# Patient Record
Sex: MALE | Race: WHITE | Employment: OTHER | URBAN - METROPOLITAN AREA
[De-identification: names, ages, dates, MRNs, and addresses within clinical notes are randomized per-mention and may not be internally consistent; named-entity substitution may affect disease eponyms.]

---

## 2018-07-21 PROBLEM — N40.0 ENLARGED PROSTATE WITHOUT LOWER URINARY TRACT SYMPTOMS (LUTS): Status: ACTIVE | Noted: 2018-07-21

## 2018-07-21 PROBLEM — E29.1 MALE HYPOGONADISM: Status: ACTIVE | Noted: 2018-07-21

## 2018-07-21 PROBLEM — N52.9 MALE ERECTILE DYSFUNCTION, UNSPECIFIED: Status: ACTIVE | Noted: 2018-07-21

## 2018-07-23 PROBLEM — Z80.42 FAMILY HISTORY OF PROSTATE CANCER: Status: ACTIVE | Noted: 2018-07-23

## 2018-07-23 PROBLEM — N39.41 URGE INCONTINENCE: Status: ACTIVE | Noted: 2018-07-23

## 2018-07-23 PROBLEM — R35.0 URINARY FREQUENCY: Status: ACTIVE | Noted: 2018-07-23

## 2018-07-23 PROBLEM — F52.32 DELAYED EJACULATION: Status: ACTIVE | Noted: 2018-07-23

## 2018-10-05 ENCOUNTER — HOSPITAL ENCOUNTER (OUTPATIENT)
Dept: PREADMISSION TESTING | Age: 78
Discharge: HOME | End: 2018-10-05
Payer: MEDICARE

## 2018-10-10 ENCOUNTER — HOSPITAL ENCOUNTER (OUTPATIENT)
Age: 78
Setting detail: OUTPATIENT SURGERY
Discharge: HOME | End: 2018-10-10
Attending: PLASTIC SURGERY | Admitting: PLASTIC SURGERY
Payer: MEDICARE

## 2018-10-10 ENCOUNTER — ANESTHESIA (OUTPATIENT)
Dept: SURGERY | Age: 78
End: 2018-10-10
Payer: MEDICARE

## 2018-10-10 ENCOUNTER — ANESTHESIA EVENT (OUTPATIENT)
Dept: SURGERY | Age: 78
End: 2018-10-10
Payer: MEDICARE

## 2018-10-10 VITALS
OXYGEN SATURATION: 98 % | HEIGHT: 72 IN | SYSTOLIC BLOOD PRESSURE: 121 MMHG | TEMPERATURE: 97.9 F | BODY MASS INDEX: 24.14 KG/M2 | WEIGHT: 178.19 LBS | DIASTOLIC BLOOD PRESSURE: 69 MMHG | HEART RATE: 71 BPM | RESPIRATION RATE: 16 BRPM

## 2018-10-10 PROCEDURE — 77030002916 HC SUT ETHLN J&J -A: Performed by: PLASTIC SURGERY

## 2018-10-10 PROCEDURE — 77030002986 HC SUT PROL J&J -A: Performed by: PLASTIC SURGERY

## 2018-10-10 PROCEDURE — 77030013567 HC DRN WND RESERV BARD -A: Performed by: PLASTIC SURGERY

## 2018-10-10 PROCEDURE — 74011250636 HC RX REV CODE- 250/636: Performed by: PLASTIC SURGERY

## 2018-10-10 PROCEDURE — 88331 PATH CONSLTJ SURG 1 BLK 1SPC: CPT | Performed by: PLASTIC SURGERY

## 2018-10-10 PROCEDURE — 88332 PATH CONSLTJ SURG EA ADD BLK: CPT | Performed by: PLASTIC SURGERY

## 2018-10-10 PROCEDURE — 74011250636 HC RX REV CODE- 250/636

## 2018-10-10 PROCEDURE — 77030002933 HC SUT MCRYL J&J -A: Performed by: PLASTIC SURGERY

## 2018-10-10 PROCEDURE — 76060000034 HC ANESTHESIA 1.5 TO 2 HR: Performed by: PLASTIC SURGERY

## 2018-10-10 PROCEDURE — 77030002966 HC SUT PDS J&J -A: Performed by: PLASTIC SURGERY

## 2018-10-10 PROCEDURE — 88305 TISSUE EXAM BY PATHOLOGIST: CPT | Performed by: PLASTIC SURGERY

## 2018-10-10 PROCEDURE — 77030011283 HC ELECTRD NDL COVD -A: Performed by: PLASTIC SURGERY

## 2018-10-10 PROCEDURE — 77030020782 HC GWN BAIR PAWS FLX 3M -B: Performed by: PLASTIC SURGERY

## 2018-10-10 PROCEDURE — 77030011265 HC ELECTRD BLD HEX COVD -A: Performed by: PLASTIC SURGERY

## 2018-10-10 PROCEDURE — 76210000020 HC REC RM PH II FIRST 0.5 HR: Performed by: PLASTIC SURGERY

## 2018-10-10 PROCEDURE — 76010000153 HC OR TIME 1.5 TO 2 HR: Performed by: PLASTIC SURGERY

## 2018-10-10 PROCEDURE — 77030018836 HC SOL IRR NACL ICUM -A: Performed by: PLASTIC SURGERY

## 2018-10-10 PROCEDURE — 74011000250 HC RX REV CODE- 250: Performed by: PLASTIC SURGERY

## 2018-10-10 PROCEDURE — 77030032490 HC SLV COMPR SCD KNE COVD -B: Performed by: PLASTIC SURGERY

## 2018-10-10 PROCEDURE — 77030012406 HC DRN WND PENRS BARD -A: Performed by: PLASTIC SURGERY

## 2018-10-10 PROCEDURE — 76210000063 HC OR PH I REC FIRST 0.5 HR: Performed by: PLASTIC SURGERY

## 2018-10-10 RX ORDER — KETOROLAC TROMETHAMINE 30 MG/ML
INJECTION, SOLUTION INTRAMUSCULAR; INTRAVENOUS AS NEEDED
Status: DISCONTINUED | OUTPATIENT
Start: 2018-10-10 | End: 2018-10-10 | Stop reason: HOSPADM

## 2018-10-10 RX ORDER — SODIUM CHLORIDE, SODIUM LACTATE, POTASSIUM CHLORIDE, CALCIUM CHLORIDE 600; 310; 30; 20 MG/100ML; MG/100ML; MG/100ML; MG/100ML
150 INJECTION, SOLUTION INTRAVENOUS CONTINUOUS
Status: DISCONTINUED | OUTPATIENT
Start: 2018-10-10 | End: 2018-10-10 | Stop reason: HOSPADM

## 2018-10-10 RX ORDER — SODIUM CHLORIDE, SODIUM LACTATE, POTASSIUM CHLORIDE, CALCIUM CHLORIDE 600; 310; 30; 20 MG/100ML; MG/100ML; MG/100ML; MG/100ML
125 INJECTION, SOLUTION INTRAVENOUS CONTINUOUS
Status: DISCONTINUED | OUTPATIENT
Start: 2018-10-10 | End: 2018-10-10 | Stop reason: HOSPADM

## 2018-10-10 RX ORDER — ONDANSETRON 2 MG/ML
INJECTION INTRAMUSCULAR; INTRAVENOUS AS NEEDED
Status: DISCONTINUED | OUTPATIENT
Start: 2018-10-10 | End: 2018-10-10 | Stop reason: HOSPADM

## 2018-10-10 RX ORDER — ONDANSETRON 2 MG/ML
4 INJECTION INTRAMUSCULAR; INTRAVENOUS ONCE
Status: DISCONTINUED | OUTPATIENT
Start: 2018-10-10 | End: 2018-10-10 | Stop reason: HOSPADM

## 2018-10-10 RX ORDER — NALOXONE HYDROCHLORIDE 0.4 MG/ML
0.1 INJECTION, SOLUTION INTRAMUSCULAR; INTRAVENOUS; SUBCUTANEOUS AS NEEDED
Status: DISCONTINUED | OUTPATIENT
Start: 2018-10-10 | End: 2018-10-10 | Stop reason: HOSPADM

## 2018-10-10 RX ORDER — OXYCODONE HYDROCHLORIDE 5 MG/1
5 TABLET ORAL ONCE
Status: DISCONTINUED | OUTPATIENT
Start: 2018-10-10 | End: 2018-10-10 | Stop reason: HOSPADM

## 2018-10-10 RX ORDER — BUPIVACAINE HYDROCHLORIDE AND EPINEPHRINE 2.5; 5 MG/ML; UG/ML
INJECTION, SOLUTION EPIDURAL; INFILTRATION; INTRACAUDAL; PERINEURAL AS NEEDED
Status: DISCONTINUED | OUTPATIENT
Start: 2018-10-10 | End: 2018-10-10 | Stop reason: HOSPADM

## 2018-10-10 RX ORDER — DIPHENHYDRAMINE HYDROCHLORIDE 50 MG/ML
12.5 INJECTION, SOLUTION INTRAMUSCULAR; INTRAVENOUS
Status: DISCONTINUED | OUTPATIENT
Start: 2018-10-10 | End: 2018-10-10 | Stop reason: HOSPADM

## 2018-10-10 RX ORDER — PROPOFOL 10 MG/ML
INJECTION, EMULSION INTRAVENOUS
Status: DISCONTINUED | OUTPATIENT
Start: 2018-10-10 | End: 2018-10-10 | Stop reason: HOSPADM

## 2018-10-10 RX ORDER — FENTANYL CITRATE 50 UG/ML
INJECTION, SOLUTION INTRAMUSCULAR; INTRAVENOUS AS NEEDED
Status: DISCONTINUED | OUTPATIENT
Start: 2018-10-10 | End: 2018-10-10 | Stop reason: HOSPADM

## 2018-10-10 RX ORDER — CEFAZOLIN SODIUM/WATER 2 G/20 ML
2 SYRINGE (ML) INTRAVENOUS ONCE
Status: COMPLETED | OUTPATIENT
Start: 2018-10-10 | End: 2018-10-10

## 2018-10-10 RX ORDER — PROPOFOL 10 MG/ML
INJECTION, EMULSION INTRAVENOUS AS NEEDED
Status: DISCONTINUED | OUTPATIENT
Start: 2018-10-10 | End: 2018-10-10 | Stop reason: HOSPADM

## 2018-10-10 RX ORDER — DEXTROSE 50 % IN WATER (D50W) INTRAVENOUS SYRINGE
25-50 AS NEEDED
Status: DISCONTINUED | OUTPATIENT
Start: 2018-10-10 | End: 2018-10-10 | Stop reason: HOSPADM

## 2018-10-10 RX ORDER — FENTANYL CITRATE 50 UG/ML
25 INJECTION, SOLUTION INTRAMUSCULAR; INTRAVENOUS AS NEEDED
Status: DISCONTINUED | OUTPATIENT
Start: 2018-10-10 | End: 2018-10-10 | Stop reason: HOSPADM

## 2018-10-10 RX ORDER — SODIUM CHLORIDE 0.9 % (FLUSH) 0.9 %
5-10 SYRINGE (ML) INJECTION AS NEEDED
Status: DISCONTINUED | OUTPATIENT
Start: 2018-10-10 | End: 2018-10-10 | Stop reason: HOSPADM

## 2018-10-10 RX ORDER — ALBUTEROL SULFATE 0.83 MG/ML
2.5 SOLUTION RESPIRATORY (INHALATION) AS NEEDED
Status: DISCONTINUED | OUTPATIENT
Start: 2018-10-10 | End: 2018-10-10 | Stop reason: HOSPADM

## 2018-10-10 RX ORDER — MAGNESIUM SULFATE 100 %
4 CRYSTALS MISCELLANEOUS AS NEEDED
Status: DISCONTINUED | OUTPATIENT
Start: 2018-10-10 | End: 2018-10-10 | Stop reason: HOSPADM

## 2018-10-10 RX ORDER — MIDAZOLAM HYDROCHLORIDE 1 MG/ML
INJECTION, SOLUTION INTRAMUSCULAR; INTRAVENOUS AS NEEDED
Status: DISCONTINUED | OUTPATIENT
Start: 2018-10-10 | End: 2018-10-10 | Stop reason: HOSPADM

## 2018-10-10 RX ADMIN — KETOROLAC TROMETHAMINE 30 MG: 30 INJECTION, SOLUTION INTRAMUSCULAR; INTRAVENOUS at 09:34

## 2018-10-10 RX ADMIN — PROPOFOL 50 MCG/KG/MIN: 10 INJECTION, EMULSION INTRAVENOUS at 08:05

## 2018-10-10 RX ADMIN — PROPOFOL 50 MG: 10 INJECTION, EMULSION INTRAVENOUS at 08:20

## 2018-10-10 RX ADMIN — FENTANYL CITRATE 25 MCG: 50 INJECTION, SOLUTION INTRAMUSCULAR; INTRAVENOUS at 08:20

## 2018-10-10 RX ADMIN — SODIUM CHLORIDE, SODIUM LACTATE, POTASSIUM CHLORIDE, AND CALCIUM CHLORIDE: 600; 310; 30; 20 INJECTION, SOLUTION INTRAVENOUS at 08:59

## 2018-10-10 RX ADMIN — SODIUM CHLORIDE, SODIUM LACTATE, POTASSIUM CHLORIDE, AND CALCIUM CHLORIDE 125 ML/HR: 600; 310; 30; 20 INJECTION, SOLUTION INTRAVENOUS at 06:35

## 2018-10-10 RX ADMIN — FENTANYL CITRATE 25 MCG: 50 INJECTION, SOLUTION INTRAMUSCULAR; INTRAVENOUS at 08:05

## 2018-10-10 RX ADMIN — MIDAZOLAM HYDROCHLORIDE 2 MG: 1 INJECTION, SOLUTION INTRAMUSCULAR; INTRAVENOUS at 07:58

## 2018-10-10 RX ADMIN — Medication 2 G: at 08:03

## 2018-10-10 RX ADMIN — ONDANSETRON 4 MG: 2 INJECTION INTRAMUSCULAR; INTRAVENOUS at 08:23

## 2018-10-10 RX ADMIN — PROPOFOL 30 MG: 10 INJECTION, EMULSION INTRAVENOUS at 09:33

## 2018-10-10 RX ADMIN — PROPOFOL 50 MG: 10 INJECTION, EMULSION INTRAVENOUS at 08:05

## 2018-10-10 NOTE — PERIOP NOTES
Reviewed PTA medication list with patient/caregiver and patient/caregiver denies any additional medications. Patient admits to having a responsible adult care for them at home for at least 24 hours after surgery. Pt denies having an active cough and confirms being able to lie still in the supine position for at least 20 minutes. Patient denies abbar chewing/swallowing difficulties. Dual skin assessment completed with Evaristo Lesches, RN.

## 2018-10-10 NOTE — PERIOP NOTES
Called [facility] and spoke with Anne Ortiz RN. Informed her that the patient has a new order in the system for Ancef that will need to be pulled in holding.

## 2018-10-10 NOTE — BRIEF OP NOTE
BRIEF OPERATIVE NOTE Date of Procedure: 10/10/2018 Preoperative Diagnosis: LESION FOREHEAD Postoperative Diagnosis: LESION FOREHEAD Procedure(s): EXCISE LESION FOREHEAD WITH FLAPS AND GRAFTS AS NEEDED UNDER FROZEN SECTION CONTROL Surgeon(s) and Role: Shade Becker MD - Primary Surgical Assistant: Siomara Samayoa Surgical Staff: 
Circ-1: Heidy Schultz RN 
Circ-Relief: Nba Dumas RN Scrub Tech-1: Ema Dance Scrub Tech-2: Kelley Wisdom Surg Asst-1: Liss Obrien Event Time In Incision Start 4274 Incision Close 0251 Anesthesia: MAC Estimated Blood Loss: 10 Specimens:  
ID Type Source Tests Collected by Time Destination 1 : LEFT FOREHEAD LESION Fresh   Martir Frederick MD 10/10/2018 2226 Pathology Findings: Margins negative on frozen section. It was a SCC Complications: None Implants: * No implants in log *

## 2018-10-10 NOTE — H&P
History and Physical 
 
Patient: Gerardo Meneses MRN: [id number]  SSN: [id number] YOB: 1940  Age: 70 y.o. Sex: male Subjective:  
  
Gerardo Meneses is a 70 y.o. male who has a malignant appearing lesion near his hairline. Has not been treated or biopsied. It occasionally bleeds and he wants it removed. Planning excision under MAC with frozen section. Past Medical History:  
Diagnosis Date  Arthritis  BPH (benign prostatic hyperplasia)  CAD (coronary artery disease) MVP  Cardiomegaly  ED (erectile dysfunction)  Esophageal reflux  Hemorrhoids  Idiopathic pulmonary fibrosis (HCC)  Low testosterone  Mitral valve prolapse  Osteopenia  Personal history of malignant neoplasm of other endocrine glands  Presbycusis  RBBB (right bundle branch block)  Sarcoidosis  Vitamin D deficiency Past Surgical History:  
Procedure Laterality Date  HX KNEE REPLACEMENT Bilateral   
 4x left, 2x right  HX LUMBAR DISKECTOMY History reviewed. No pertinent family history. Social History Substance Use Topics  Smoking status: Former Smoker Quit date: 7/23/1965  Smokeless tobacco: Never Used  Alcohol use No  
  
Prior to Admission medications Medication Sig Start Date End Date Taking? Authorizing Provider  
multivitamin (ONE A DAY) tablet Take 2 Tabs by mouth daily. Historical Provider TURMERIC PO Take  by mouth daily. Historical Provider OTHER,NON-FORMULARY, Testosterone booster    Historical Provider  
zinc 50 mg tab tablet Take 50 mg by mouth daily. Historical Provider No Known Allergies Review of Systems: 
Pertinent items are noted in the History of Present Illness. Objective:  
 
Vitals:  
 10/10/18 0555 BP: 126/72 Pulse: 66 Resp: 16 Temp: 98.1 °F (36.7 °C) SpO2: 97% Weight: 80.8 kg (178 lb 3 oz) Height: 6' (1.829 m) Physical Exam: GENERAL: alert, cooperative, no distress, appears stated age HEENT: Ulcerative lesion central forehead near hairline Assessment:  
 
Hospital Problems  Date Reviewed: 10/10/2018 None Plan:  
 
Removal today under MAC with frozen section control. Likely will need FTSG taken from neck. Signed By: Kimo Song MD   
 October 10, 2018

## 2018-10-10 NOTE — DISCHARGE INSTRUCTIONS
Can shower starting tomorrow. Take pain medication as needed. Apply bacitracin to incision to keep moist.  Avoid strenuous activity. Patient armband removed and shredded    DISCHARGE SUMMARY from Nurse    PATIENT INSTRUCTIONS:    After general anesthesia or intravenous sedation, for 24 hours or while taking prescription Narcotics:  · Limit your activities  · Do not drive and operate hazardous machinery  · Do not make important personal or business decisions  · Do  not drink alcoholic beverages  · If you have not urinated within 8 hours after discharge, please contact your surgeon on call. Report the following to your surgeon:  · Excessive pain, swelling, redness or odor of or around the surgical area  · Temperature over 100.5  · Nausea and vomiting lasting longer than 4 hours or if unable to take medications  · Any signs of decreased circulation or nerve impairment to extremity: change in color, persistent  numbness, tingling, coldness or increase pain  · Any questions    What to do at Home:  Recommended activity: Activity as tolerated and no driving for today and Ambulate in house,     If you experience any of the following symptoms above, please follow up with Dr. Angelica Valiente. *  Please give a list of your current medications to your Primary Care Provider. *  Please update this list whenever your medications are discontinued, doses are      changed, or new medications (including over-the-counter products) are added. *  Please carry medication information at all times in case of emergency situations. These are general instructions for a healthy lifestyle:    No smoking/ No tobacco products/ Avoid exposure to second hand smoke  Surgeon General's Warning:  Quitting smoking now greatly reduces serious risk to your health.     Obesity, smoking, and sedentary lifestyle greatly increases your risk for illness    A healthy diet, regular physical exercise & weight monitoring are important for maintaining a healthy lifestyle    You may be retaining fluid if you have a history of heart failure or if you experience any of the following symptoms:  Weight gain of 3 pounds or more overnight or 5 pounds in a week, increased swelling in our hands or feet or shortness of breath while lying flat in bed. Please call your doctor as soon as you notice any of these symptoms; do not wait until your next office visit. Recognize signs and symptoms of STROKE:    F-face looks uneven    A-arms unable to move or move unevenly    S-speech slurred or non-existent    T-time-call 911 as soon as signs and symptoms begin-DO NOT go       Back to bed or wait to see if you get better-TIME IS BRAIN. Warning Signs of HEART ATTACK     Call 911 if you have these symptoms:   Chest discomfort. Most heart attacks involve discomfort in the center of the chest that lasts more than a few minutes, or that goes away and comes back. It can feel like uncomfortable pressure, squeezing, fullness, or pain.  Discomfort in other areas of the upper body. Symptoms can include pain or discomfort in one or both arms, the back, neck, jaw, or stomach.  Shortness of breath with or without chest discomfort.  Other signs may include breaking out in a cold sweat, nausea, or lightheadedness. Don't wait more than five minutes to call 911 - MINUTES MATTER! Fast action can save your life. Calling 911 is almost always the fastest way to get lifesaving treatment. Emergency Medical Services staff can begin treatment when they arrive -- up to an hour sooner than if someone gets to the hospital by car. The discharge information has been reviewed with the patient and spouse. The patient and spouse verbalized understanding.   Discharge medications reviewed with the patient and spouse and appropriate educational materials and side effects teaching were provided.   ___________________________________________________________________________________________________________________________________

## 2018-10-10 NOTE — IP AVS SNAPSHOT
[address] [place] [address] 
[phone] Patient: Deneen Cannon MRN: [id number] BAS:[birth year] About your hospitalization You were admitted on:  October 10, 2018 You last received care in the:  THE [facility] PACU You were discharged on:  October 10, 2018 Why you were hospitalized Your primary diagnosis was:  Not on File Follow-up Information Follow up With Details Comments Contact Info Charles Wallace, [address] [place] [address] 
[phone] Discharge Orders None A check sandor indicates which time of day the medication should be taken. My Medications ASK your doctor about these medications Instructions Each Dose to Equal  
 Morning Noon Evening Bedtime  
 multivitamin tablet Commonly known as:  ONE A DAY Your last dose was: Your next dose is: Take 2 Tabs by mouth daily. 2 Tab OTHER(NON-FORMULARY) Your last dose was: Your next dose is:    
   
   
 Testosterone booster TURMERIC PO Your last dose was: Your next dose is: Take  by mouth daily. zinc 50 mg Tab tablet Your last dose was: Your next dose is: Take 50 mg by mouth daily. 50 mg Discharge Instructions Can shower starting tomorrow. Take pain medication as needed. Apply bacitracin to incision to keep moist.  Avoid strenuous activity. Patient armband removed and shredded DISCHARGE SUMMARY from Nurse PATIENT INSTRUCTIONS: 
 
 
F-face looks uneven A-arms unable to move or move unevenly S-speech slurred or non-existent T-time-call 911 as soon as signs and symptoms begin-DO NOT go Back to bed or wait to see if you get better-TIME IS BRAIN. Warning Signs of HEART ATTACK Call 911 if you have these symptoms: 
? Chest discomfort. Most heart attacks involve discomfort in the center of the chest that lasts more than a few minutes, or that goes away and comes back. It can feel like uncomfortable pressure, squeezing, fullness, or pain. ? Discomfort in other areas of the upper body. Symptoms can include pain or discomfort in one or both arms, the back, neck, jaw, or stomach. ? Shortness of breath with or without chest discomfort. ? Other signs may include breaking out in a cold sweat, nausea, or lightheadedness. Don't wait more than five minutes to call [address]! Fast action can save your life. Calling 911 is almost always the fastest way to get lifesaving treatment. Emergency Medical Services staff can begin treatment when they arrive  up to an hour sooner than if someone gets to the hospital by car. The discharge information has been reviewed with the patient and spouse. The patient and spouse verbalized understanding. Discharge medications reviewed with the patient and spouse and appropriate educational materials and side effects teaching were provided. ___________________________________________________________________________________________________________________________________ Introducing [facility]! [employer] introduces Front Up patient portal. Now you can access parts of your medical record, email your doctor's office, and request medication refills online. 1. In your internet browser, go to [email or web address] 2. Click on the First Time User? Click Here link in the Sign In box. You will see the New Member Sign Up page. 3. Enter your [employer] Access Code exactly as it appears below. You will not need to use this code after youve completed the sign-up process. If you do not sign up before the expiration date, you must request a new code. · [employer] Access Code: [email or web address] Expires: 10/21/2018  4:05 PM 
 
4. Enter the last four digits of your Social Security Number (xxxx) and Date of Birth (mm/dd/yyyy) as indicated and click Submit. You will be taken to the next sign-up page. 5. Create a [employer] ID. This will be your [employer] login ID and cannot be changed, so think of one that is secure and easy to remember. 6. Create a [employer] password. You can change your password at any time. 7. Enter your Password Reset Question and Answer. This can be used at a later time if you forget your password. 8. Enter your e-mail address. You will receive e-mail notification when new information is available in [address] 9. Click Sign Up. You can now view and download portions of your medical record. 10. Click the Download Summary menu link to download a portable copy of your medical information. If you have questions, please visit the Frequently Asked Questions section of the [employer] website. Remember, [employer] is NOT to be used for urgent needs. For medical emergencies, dial 911. Now available from your iPhone and Android! Introducing Ashok Barnes As a [facility] patient, I wanted to make you aware of our electronic visit tool called Ashok Delondilma. [facility] 24/7 allows you to connect within minutes with a medical provider 24 hours a day, seven days a week via a mobile device or tablet or logging into a secure website from your computer. You can access Ashok Barnes from anywhere in the [place].  
 
A virtual visit might be right for you when you have a simple condition and feel like you just dont want to get out of bed, or cant get away from work for an appointment, when your regular [facility] provider is not available (evenings, weekends or holidays), or when youre out of town and need minor care. Electronic visits cost only $49 and if the [facility] 24/Fairphone provider determines a prescription is needed to treat your condition, one can be electronically transmitted to a nearby pharmacy*. Please take a moment to enroll today if you have not already done so. The enrollment process is free and takes just a few minutes. To enroll, please download the [employer]/Fairphone nory to your tablet or phone, or visit [email or web address] to enroll on your computer. And, as an [address] [place] [address] patient with a An[employer] account, the results of your visits will be scanned into your electronic medical record and your primary care provider will be able to view the scanned results. We urge you to continue to see your regular [facility] provider for your ongoing medical care. And while your primary care provider may not be the one available when you seek a [employer] virtual visit, the peace of mind you get from getting a real diagnosis real time can be priceless. For more information on Web Performance, view our Frequently Asked Questions (FAQs) at [email or web address]. Sincerely, 
 
Davina Sweet MD 
Chief Medical Officer Darryn Thomas *:  certain medications cannot be prescribed via Web Performance Providers Seen During Your Hospitalization Provider Specialty Primary office phone Raudel Bhandari MD Plastic Surgery [phone] Your Primary Care Physician (PCP) Primary Care Physician Office Phone Office Fax Nava Sesay [phone] You are allergic to the following No active allergies Recent Documentation Height Weight BMI Smoking Status 1.829 m 80.8 kg 24.17 kg/m2 Former Smoker Emergency Contacts Name Discharge Info Relation Home Work Mobile Ted Solis CAREGIVER [3] Friend [5]   [phone] CervantesSALVADOR Pierre Capellan DISCHARGE CAREGIVER [3] Brother [24] [phone] Patient Belongings The following personal items are in your possession at time of discharge: 
  Dental Appliances: None  Visual Aid: Glasses      Home Medications: None   Jewelry: Watch (sent with security)  Clothing: Undergarments, Shirt, Belt, Pants, Footwear, Socks (locker 6)    Other Valuables: Maria Isabel, GWENDOLYN, [address] [place] [address], Eyeglasses, Money (comment) (sent with security) Please provide this summary of care documentation to your next provider. Signatures-by signing, you are acknowledging that this After Visit Summary has been reviewed with you and you have received a copy. Patient Signature:  ____________________________________________________________ Date:  ____________________________________________________________  
  
[facility] Provider Signature:  ____________________________________________________________ Date:  ____________________________________________________________

## 2018-10-10 NOTE — OP NOTES
[facility] [place]  OPERATIVE REPORT    Ashley Albarran  MR#: [id number]  : 1940  ACCOUNT #: [de-identified]   DATE OF SERVICE: 10/10/2018    SURGEON:  Love Arevalo MD     PROCEDURES PERFORMED:  1. Excision of malignant lesion of the forehead measuring 5.5 x 3.5 cm.  2.  Complex closure of the forehead measuring 8 cm in incision length. PREOPERATIVE DIAGNOSIS:  Malignant lesion on the forehead. POSTOPERATIVE DIAGNOSES:  Squamous cell carcinoma. ESTIMATED BLOOD LOSS:  10 mL. SPECIMENS REMOVED:  The specimen was excised and oriented and sent to Pathology:      START TIME:  8:26    END TIME:  9:49. ASSISTANT:  Mani Stoner surgical assistant. COMPLICATIONS:  None. IMPLANTS:  None. FINDINGS:  Margins were clear on frozen section. ANESTHESIA:  MAC with Local     INDICATIONS FOR SURGERY:  The patient is a 82-year-old male with a longstanding history of an ulcerative lesion on his forehead. This has never been biopsied, but it did occasionally bleed and bothered him. I suspected this was a malignant lesion and so scheduled him for an excision under frozen section and closure in the operating room. We did discuss the risks that include bleeding, infection, damage to surrounding structures, wound healing complications, and scarring, and he was accepting and we are going to proceed. DESCRIPTION OF PROCEDURE:  The patient was identified preoperatively. Informed consent was obtained. Risks and benefits were discussed as delineated above. He was taken to the operating room and placed up on the operating table. The area was infiltrated with 0.25% Marcaine with epinephrine and MAC anesthesia was administered. He was then prepped and draped in standard fashion. Timeout was performed. I excised the lesion down to the level of the periosteum and oriented this with sutures at the 12 o'clock, 6 o'clock, 3 o'clock, and 9 o'clock positions.   This was then sent off for analysis. Hemostasis was controlled and I did wide undermining of the forehead at the scalp and was actually able to bring this wound together under moderate tension. After I heard back that all the margins were clear and the diagnosis was a squamous cell carcinoma, I had to excise dog ears on both sides and then closure was done with 3-0 clear PDS for the galea and interrupted 3-0 Prolene sutures superficially. This gave him a linear closure with minimal distortion of his eyebrow. This was covered with bacitracin and some aids. He tolerated the procedure well and was transferred to postanesthesia care in stable condition. All counts were correct.       Natalie Anne MD CSP / LN  D: 10/10/2018 10:16     T: 10/10/2018 11:47  JOB #: [id number]

## 2018-10-10 NOTE — ANESTHESIA PREPROCEDURE EVALUATION
Anesthetic History No history of anesthetic complications Review of Systems / Medical History Patient summary reviewed, nursing notes reviewed and pertinent labs reviewed Pulmonary Within defined limits Neuro/Psych Within defined limits Cardiovascular Dysrhythmias CAD Exercise tolerance: >4 METS 
  
GI/Hepatic/Renal 
Within defined limits Endo/Other Arthritis Other Findings Physical Exam 
 
Airway Mallampati: II 
TM Distance: 4 - 6 cm Neck ROM: normal range of motion Mouth opening: Normal 
 
 Cardiovascular Regular rate and rhythm,  S1 and S2 normal,  no murmur, click, rub, or gallop Dental 
No notable dental hx Pulmonary Breath sounds clear to auscultation Abdominal 
GI exam deferred Other Findings Anesthetic Plan ASA: 2 Anesthesia type: MAC - femoral single shot Anesthetic plan and risks discussed with: Patient

## 2018-10-10 NOTE — ANESTHESIA POSTPROCEDURE EVALUATION
Post-Anesthesia Evaluation & Assessment Visit Vitals  /56  Pulse 70  Temp 36.6 °C (97.9 °F)  Resp 16  
 Ht 6' (1.829 m)  Wt 80.8 kg (178 lb 3 oz)  SpO2 97%  BMI 24.17 kg/m2 Nausea/Vomiting: no nausea Post-operative hydration adequate. Pain score (VAS): 2 Mental status & Level of consciousness: alert and oriented x 3 Neurological status: moves all extremities, sensation grossly intact Pulmonary status: airway patent, no supplemental oxygen required Complications related to anesthesia: none Patient has met all discharge requirements.  
 
Additional comments: 
 
 
 
Francisco J Saxena MD

## 2018-10-10 NOTE — PERIOP NOTES
Report to Sissy Ashraf will transfer to phase 2 level of care. Paged Dr. Banuelos Kidney for a sign out note.

## 2018-10-10 NOTE — PERIOP NOTES
TRANSFER - IN REPORT: 
 
Verbal report received from ORN & CRNA on Marta Cornea  being received from OR (unit) for routine post - op Report consisted of patients Situation, Background, Assessment and  
Recommendations(SBAR). Information from the following report(s) SBAR, Intake/Output and MAR was reviewed with the receiving nurse. Opportunity for questions and clarification was provided. Assessment completed upon patients arrival to unit and care assumed.

## 2020-03-10 ENCOUNTER — HOSPITAL ENCOUNTER (OUTPATIENT)
Age: 80
Setting detail: OUTPATIENT SURGERY
Discharge: HOME | End: 2020-03-10
Attending: INTERNAL MEDICINE | Admitting: INTERNAL MEDICINE
Payer: MEDICARE

## 2020-03-10 VITALS
SYSTOLIC BLOOD PRESSURE: 128 MMHG | WEIGHT: 183 LBS | TEMPERATURE: 98 F | DIASTOLIC BLOOD PRESSURE: 70 MMHG | HEIGHT: 72 IN | OXYGEN SATURATION: 99 % | BODY MASS INDEX: 24.79 KG/M2 | HEART RATE: 77 BPM | RESPIRATION RATE: 23 BRPM

## 2020-03-10 DIAGNOSIS — I34.0 SEVERE MITRAL REGURGITATION: ICD-10-CM

## 2020-03-10 LAB
ALBUMIN SERPL-MCNC: 3.7 G/DL (ref 3.4–5)
ALBUMIN/GLOB SERPL: 1.1 {RATIO} (ref 0.8–1.7)
ALP SERPL-CCNC: 77 U/L (ref 45–117)
ALT SERPL-CCNC: 17 U/L (ref 16–61)
ANION GAP SERPL CALC-SCNC: 6 MMOL/L (ref 3–18)
AST SERPL-CCNC: 23 U/L (ref 10–38)
BASOPHILS # BLD: 0 K/UL (ref 0–0.1)
BASOPHILS NFR BLD: 1 % (ref 0–2)
BILIRUB SERPL-MCNC: 0.7 MG/DL (ref 0.2–1)
BUN SERPL-MCNC: 18 MG/DL (ref 7–18)
BUN/CREAT SERPL: 17 (ref 12–20)
CALCIUM SERPL-MCNC: 8.7 MG/DL (ref 8.5–10.1)
CHLORIDE SERPL-SCNC: 107 MMOL/L (ref 100–111)
CO2 SERPL-SCNC: 28 MMOL/L (ref 21–32)
CREAT SERPL-MCNC: 1.06 MG/DL (ref 0.6–1.3)
DIFFERENTIAL METHOD BLD: ABNORMAL
EOSINOPHIL # BLD: 0.2 K/UL (ref 0–0.4)
EOSINOPHIL NFR BLD: 4 % (ref 0–5)
ERYTHROCYTE [DISTWIDTH] IN BLOOD BY AUTOMATED COUNT: 13.9 % (ref 11.6–14.5)
GLOBULIN SER CALC-MCNC: 3.3 G/DL (ref 2–4)
GLUCOSE SERPL-MCNC: 88 MG/DL (ref 74–99)
HCT VFR BLD AUTO: 41.1 % (ref 36–48)
HGB BLD-MCNC: 13.5 G/DL (ref 13–16)
INR PPP: 1.1 (ref 0.8–1.2)
LYMPHOCYTES # BLD: 0.8 K/UL (ref 0.9–3.6)
LYMPHOCYTES NFR BLD: 16 % (ref 21–52)
MCH RBC QN AUTO: 29.7 PG (ref 24–34)
MCHC RBC AUTO-ENTMCNC: 32.8 G/DL (ref 31–37)
MCV RBC AUTO: 90.5 FL (ref 74–97)
MONOCYTES # BLD: 0.5 K/UL (ref 0.05–1.2)
MONOCYTES NFR BLD: 10 % (ref 3–10)
NEUTS SEG # BLD: 3.4 K/UL (ref 1.8–8)
NEUTS SEG NFR BLD: 69 % (ref 40–73)
PLATELET # BLD AUTO: 186 K/UL (ref 135–420)
PMV BLD AUTO: 10 FL (ref 9.2–11.8)
POTASSIUM SERPL-SCNC: 3.8 MMOL/L (ref 3.5–5.5)
PROT SERPL-MCNC: 7 G/DL (ref 6.4–8.2)
PROTHROMBIN TIME: 13.8 SEC (ref 11.5–15.2)
RBC # BLD AUTO: 4.54 M/UL (ref 4.7–5.5)
SODIUM SERPL-SCNC: 141 MMOL/L (ref 136–145)
WBC # BLD AUTO: 5 K/UL (ref 4.6–13.2)

## 2020-03-10 PROCEDURE — 93460 R&L HRT ART/VENTRICLE ANGIO: CPT | Performed by: INTERNAL MEDICINE

## 2020-03-10 PROCEDURE — 77030013797 HC KT TRNSDUC PRSSR EDWD -A: Performed by: INTERNAL MEDICINE

## 2020-03-10 PROCEDURE — 74011636320 HC RX REV CODE- 636/320: Performed by: INTERNAL MEDICINE

## 2020-03-10 PROCEDURE — 99153 MOD SED SAME PHYS/QHP EA: CPT | Performed by: INTERNAL MEDICINE

## 2020-03-10 PROCEDURE — 99152 MOD SED SAME PHYS/QHP 5/>YRS: CPT | Performed by: INTERNAL MEDICINE

## 2020-03-10 PROCEDURE — C1894 INTRO/SHEATH, NON-LASER: HCPCS | Performed by: INTERNAL MEDICINE

## 2020-03-10 PROCEDURE — C1751 CATH, INF, PER/CENT/MIDLINE: HCPCS | Performed by: INTERNAL MEDICINE

## 2020-03-10 PROCEDURE — 77030004522 HC CATH ANGI DX EXPO BSC -A: Performed by: INTERNAL MEDICINE

## 2020-03-10 PROCEDURE — 85610 PROTHROMBIN TIME: CPT

## 2020-03-10 PROCEDURE — 74011000250 HC RX REV CODE- 250: Performed by: INTERNAL MEDICINE

## 2020-03-10 PROCEDURE — 80053 COMPREHEN METABOLIC PANEL: CPT

## 2020-03-10 PROCEDURE — C1769 GUIDE WIRE: HCPCS | Performed by: INTERNAL MEDICINE

## 2020-03-10 PROCEDURE — 74011250636 HC RX REV CODE- 250/636: Performed by: INTERNAL MEDICINE

## 2020-03-10 PROCEDURE — 77030029997 HC DEV COM RDL R BND TELE -B: Performed by: INTERNAL MEDICINE

## 2020-03-10 PROCEDURE — 77030008543 HC TBNG MON PRSS MRTM -A: Performed by: INTERNAL MEDICINE

## 2020-03-10 PROCEDURE — 85025 COMPLETE CBC W/AUTO DIFF WBC: CPT

## 2020-03-10 RX ORDER — LIDOCAINE HYDROCHLORIDE 10 MG/ML
INJECTION INFILTRATION; PERINEURAL AS NEEDED
Status: DISCONTINUED | OUTPATIENT
Start: 2020-03-10 | End: 2020-03-10 | Stop reason: HOSPADM

## 2020-03-10 RX ORDER — SODIUM CHLORIDE 9 MG/ML
50 INJECTION, SOLUTION INTRAVENOUS CONTINUOUS
Status: DISCONTINUED | OUTPATIENT
Start: 2020-03-10 | End: 2020-03-10 | Stop reason: HOSPADM

## 2020-03-10 RX ORDER — HEPARIN SODIUM 200 [USP'U]/100ML
INJECTION, SOLUTION INTRAVENOUS
Status: COMPLETED | OUTPATIENT
Start: 2020-03-10 | End: 2020-03-10

## 2020-03-10 RX ORDER — HEPARIN SODIUM 1000 [USP'U]/ML
INJECTION, SOLUTION INTRAVENOUS; SUBCUTANEOUS AS NEEDED
Status: DISCONTINUED | OUTPATIENT
Start: 2020-03-10 | End: 2020-03-10 | Stop reason: HOSPADM

## 2020-03-10 RX ORDER — BUDESONIDE AND FORMOTEROL FUMARATE DIHYDRATE 160; 4.5 UG/1; UG/1
2 AEROSOL RESPIRATORY (INHALATION) 2 TIMES DAILY
COMMUNITY

## 2020-03-10 RX ORDER — SODIUM CHLORIDE 0.9 % (FLUSH) 0.9 %
5-40 SYRINGE (ML) INJECTION AS NEEDED
Status: CANCELLED | OUTPATIENT
Start: 2020-03-10

## 2020-03-10 RX ORDER — BISMUTH SUBSALICYLATE 262 MG
1 TABLET,CHEWABLE ORAL DAILY
COMMUNITY

## 2020-03-10 RX ORDER — LANOLIN ALCOHOL/MO/W.PET/CERES
1000 CREAM (GRAM) TOPICAL DAILY
COMMUNITY

## 2020-03-10 RX ORDER — SODIUM CHLORIDE 0.9 % (FLUSH) 0.9 %
5-40 SYRINGE (ML) INJECTION EVERY 8 HOURS
Status: CANCELLED | OUTPATIENT
Start: 2020-03-10

## 2020-03-10 RX ORDER — MELATONIN
1000 DAILY
COMMUNITY

## 2020-03-10 RX ORDER — VERAPAMIL HYDROCHLORIDE 2.5 MG/ML
INJECTION, SOLUTION INTRAVENOUS AS NEEDED
Status: DISCONTINUED | OUTPATIENT
Start: 2020-03-10 | End: 2020-03-10 | Stop reason: HOSPADM

## 2020-03-10 RX ORDER — ALENDRONATE SODIUM 70 MG/1
70 TABLET ORAL
COMMUNITY

## 2020-03-10 RX ORDER — FENTANYL CITRATE 50 UG/ML
INJECTION, SOLUTION INTRAMUSCULAR; INTRAVENOUS AS NEEDED
Status: DISCONTINUED | OUTPATIENT
Start: 2020-03-10 | End: 2020-03-10 | Stop reason: HOSPADM

## 2020-03-10 NOTE — PROGRESS NOTES
Returned to care unit via stretcher,  Pt aaox3, denies any pain or distress, tr band intact to left wrist area, site clean, dry and intact

## 2020-03-10 NOTE — PROGRESS NOTES
Pt discharged via wheelchair to car in care of friend. Pt denies any pain or distress at time of discharge.  Arm bands removed and shredded

## 2020-03-10 NOTE — PROCEDURES
LHC and Coronary angiogram done via left radial approach. RHC done via left anti cubital vein. Coronary anatomy described below. Coronary angiogram revealed all coronaries has luminal irregularities. No significant intraluminal narrowing seen. LV gram was not done. LVEDP 12-14 mm hg. No significant gradient on pull back. RHC revealed PCWP 23 mm hg, A ave 20 mm hg, V wave 34 mm hg, mean PA pressure 24 mm hg, RV pressure 40/3/5 mm hg, mean RA pressure 4 mm hg.     Cardiac out put by Barbie's method 4.90 l/min,  PVR 1.02 woods unit    Patient tolerated procedure well. Scant blood loss. No complications. No specimen removed. Recommendation:    Medication considered:   Aspirin, beta blocker, ACEI, Nitrates and statin     CAD risk factor education  Diet education  Control cholesterol  Blood pressure control  Exercise education: Age and functional status appropriate.   Refer to CT surgeon for mitral valve repair/replacement

## 2020-03-10 NOTE — Clinical Note
TRANSFER - OUT REPORT:     Verbal report given to: rn.     Report consisted of patient's Situation, Background, Assessment and   Recommendations(SBAR). Opportunity for questions and clarification was provided. Patient transported with a Registered Nurse and [address] [place] [address] / [facility] [employer]. Patient transported to: care unit.

## 2020-03-10 NOTE — DISCHARGE INSTRUCTIONS
Patient Education        Mitral Valve Regurgitation: Care Instructions  Your Care Instructions    The mitral valve lets blood flow from the upper to lower areas of the heart. Mitral valve regurgitation occurs when the valve cannot close all the way and blood backs up (regurgitates) into the upper area of the heart. This causes the heart to work harder to pump the extra blood. Mild regurgitation causes few problems. Many people have it for many years without having problems. But if the regurgitation is severe, it can weaken the heart and lead to heart failure. The causes of mitral valve regurgitation include a heart attack, heart infection (endocarditis), mitral valve prolapse, cardiomyopathy, calcium buildup in the heart, the weight-loss medicine Fen-Phen, and diseases such as lupus and rheumatoid arthritis. Some people are born with the valve problem. Your doctor may just want to watch your health closely if you have mild mitral valve regurgitation. You may take medicine to treat a problem that is causing the regurgitation. Or you may take medicine for other health problems that are caused by mitral regurgitation. For more severe disease, the valve may need to be repaired or replaced. Follow-up care is a key part of your treatment and safety. Be sure to make and go to all appointments, and call your doctor if you are having problems. It's also a good idea to know your test results and keep a list of the medicines you take. How can you care for yourself at home? · Be safe with medicines. Take your medicines exactly as prescribed. Call your doctor if you think you are having a problem with your medicine. You will get more details on the specific medicines your doctor prescribes. · Eat heart-healthy foods such as fruits, vegetables, whole grains, fish, lean meats, and low-fat or nonfat dairy foods. Limit sodium, sugar, and alcohol. · Be active. Ask your doctor what type and level of exercise is safe for you. Walking is a good choice. You also may want to swim, bike, or do other activities. Let your doctor know if your ability to exercise changes. · Do not smoke. If you need help quitting, talk to your doctor about stop-smoking programs and medicines. These can increase your chances of quitting for good. · Stay at a healthy weight. Lose weight if you need to. · Avoid colds and flu. Get a pneumococcal vaccine shot. If you have had one before, ask your doctor if you need another dose. Get a flu vaccine every year. · Take care of your teeth and gums. Get regular dental checkups. Good dental health is important because bacteria can spread from infected teeth and gums to the heart valves. When should you call for help? Call 911 anytime you think you may need emergency care. For example, call if:    · You have severe trouble breathing.     · You cough up pink, foamy mucus.     · You have symptoms of a heart attack. These may include:  ? Chest pain or pressure, or a strange feeling in the chest.  ? Sweating. ? Shortness of breath. ? Nausea or vomiting. ? Pain, pressure, or a strange feeling in the back, neck, jaw, or upper belly or in one or both shoulders or arms. ? Lightheadedness or sudden weakness. ? A fast or irregular heartbeat. After you call 911, the  may tell you to chew 1 adult-strength or 2 to 4 low-dose aspirin. Wait for an ambulance. Do not try to drive yourself.    Call your doctor now or seek immediate medical care if:    · You have new or increased shortness of breath.     · You are dizzy or lightheaded, or you feel like you may faint.     · You have sudden weight gain, such as more than 2 to 3 pounds in a day or 5 pounds in a week.  (Your doctor may suggest a different range of weight gain.)     · You have increased swelling in your legs, ankles, or feet.     · You are suddenly so tired or weak that you cannot do your usual activities.    Watch closely for changes in your health, and be sure to contact your doctor if you develop new symptoms. Where can you learn more? Go to http://regino-tierra.info/. Enter G003 in the search box to learn more about \"Mitral Valve Regurgitation: Care Instructions. \"  Current as of: April 9, 2019  Content Version: 12.2  © 4008-9737 [facility]. Care instructions adapted under license by [employer] (which disclaims liability or warranty for this information). If you have questions about a medical condition or this instruction, always ask your healthcare professional. [address] any warranty or liability for your use of this information. Cardiac Catheterization/Angiography Discharge Instructions    *Check the puncture site frequently for swelling or bleeding. If you see any bleeding, lie down and apply pressure over the area with a clean town or washcloth. Notify your doctor for any redness, swelling, drainage or oozing from the puncture site. Notify your doctor for any fever or chills. *If the leg or arm with the puncture becomes cold, numb or painful, go to the emergency room    *Activity should be limited for the next 24 hours. Climb stairs as little as possible and avoid any stooping, bending or strenuous activity for 24 hours. No heavy lifting (anything over 10 pounds) for five days. *Do not drive for 24 hours. *You may resume your usual diet. Drink more fluids than usual.    *Have a responsible person drive you home and stay with you for at least 24 hours after your heart catheterization/angiography. *You may remove the bandage from your {ARM/GROIN:14578} in 24 hours. You may shower in 24 hours. No tub baths, hot tubs or swimming for one week. Do not place any lotions, creams, powders, ointments over the puncture site for one week. You may place a clean band-aid over the puncture site each day for 5 days. Change this daily.

## 2020-03-10 NOTE — H&P
Date of Surgery Update:  Wilfred Ha was seen and examined. History and physical has been reviewed. The patient has been examined. There have been no significant clinical changes since the completion of the originally dated History and Physical.      Patient assessed and is candidate for moderate sedation.       Signed By: Sadie Boone MD     March 10, 2020 9:29 AM

## 2020-03-10 NOTE — DISCHARGE SUMMARY
Discharge Summary    Patient: Tamie Monterroso MRN: [id number]  CSN: [id number]    YOB: 1940  Age: 70 y.o. Sex: male    DOA: 3/10/2020 LOS:  LOS: 0 days   Discharge Date:      Primary Care Provider:  Camille Benjamin DO    Admission Diagnoses: Severe mitral regurgitation [I34.0]    Discharge Diagnoses:  Severe MR, non rheumatic   Problem List as of 3/10/2020 Date Reviewed: 10/10/2018          Codes Class Noted - Resolved    Delayed ejaculation ICD-10-CM: F52.32  ICD-9-CM: 608.89  7/23/2018 - Present        Male erectile dysfunction, unspecified ICD-10-CM: N52.9  ICD-9-CM: 607.84  7/21/2018 - Present        Male hypogonadism ICD-10-CM: E29.1  ICD-9-CM: 257.2  7/21/2018 - Present        Enlarged prostate without lower urinary tract symptoms (luts) ICD-10-CM: N40.0  ICD-9-CM: 600.00  7/21/2018 - Present        Urge incontinence ICD-10-CM: N39.41  ICD-9-CM: 788.31  7/23/2018 - Present        Urinary frequency ICD-10-CM: R35.0  ICD-9-CM: 788.41  7/23/2018 - Present        Family history of prostate cancer ICD-10-CM: Z80.42  ICD-9-CM: V16.42  7/23/2018 - Present              Discharge Medications:     Current Discharge Medication List      CONTINUE these medications which have NOT CHANGED    Details   alendronate (FOSAMAX) 70 mg tablet Take 70 mg by mouth every seven (7) days. cholecalciferol (VITAMIN D3) (1000 Units /25 mcg) tablet Take 1,000 Units by mouth daily. cyanocobalamin (VITAMIN B-12) 1,000 mcg tablet Take 1,000 mcg by mouth daily. multivitamin (ONE A DAY) tablet Take 1 Tab by mouth daily. budesonide-formoteroL (SYMBICORT) 160-4.5 mcg/actuation HFAA Take 2 Puffs by inhalation two (2) times a day. stjf-ojmv-unt-zmk-ptp-xmxj-hor (TUMERSAID) 546-746-509-125 mg tab Take  by mouth.              Discharge Condition: Stable     Procedures : LHC, RHC, coronary angiogram     Consults: None      PHYSICAL EXAM   Visit Vitals  /75 (BP 1 Location: Right arm, BP Patient Position: Supine)   Pulse 66   Temp 98 °F (36.7 °C)   Resp 16   Ht 6' (1.829 m)   Wt 83 kg (183 lb)   SpO2 94%   BMI 24.82 kg/m²     General: Awake, cooperative, no acute distress    HEENT: NC, Atraumatic. PERRLA, EOMI. Anicteric sclerae. Lungs:  CTA Bilaterally. No Wheezing/Rhonchi/Rales. Heart:  Regular  rhythm,  No murmur, No Rubs, No Gallops  Abdomen: Soft, Non distended, Non tender. +Bowel sounds,   Extremities: No c/c/e  Psych:   Not anxious or agitated. Neurologic:  No acute neurological deficits. Admission HPI : See H/P    Hospital Course : Patient came for out patient LHC, RHC and coronary angiogram.   LHC and Coronary angiogram done via left radial approach. RHC done via left anti cubital vein. Coronary anatomy described below. Coronary angiogram revealed all coronaries has luminal irregularities. No significant intraluminal narrowing seen. LV gram was not done. LVEDP 12-14 mm hg. No significant gradient on pull back. RHC revealed PCWP 23 mm hg, A ave 20 mm hg, V wave 34 mm hg, mean PA pressure 24 mm hg, RV pressure 40/3/5 mm hg, mean RA pressure 4 mm hg.     Cardiac out put by Barbie's method 4.90 l/min,  PVR 1.02 woods unit    Patient tolerated procedure well. Scant blood loss. No complications. No specimen removed. Recommendation:    Medication considered:   Aspirin, beta blocker, ACEI, Nitrates and statin     CAD risk factor education  Diet education  Control cholesterol  Blood pressure control  Exercise education: Age and functional status appropriate. Refer to CT surgeon for mitral valve repair/replacement     Activity: No driving for 24 hours, no weight lifting no more than 10 lbs from left hand for 1 week    Diet: Cardiac     Follow-up:  In cardiology clinic after 2 weeks    Disposition: Home    Minutes spent on discharge: 39 minutes       Labs: Results:       Chemistry Recent Labs     03/10/20  0752   GLU 88      K 3.8    CO2 28   BUN 18   CREA 1.06   CA 8.7   AGAP 6   BUCR 17   AP 77   TP 7.0   ALB 3.7   GLOB 3.3   AGRAT 1.1      CBC w/Diff Recent Labs     03/10/20  0752   WBC 5.0   RBC 4.54*   HGB 13.5   HCT 41.1      GRANS 69   LYMPH 16*   EOS 4      Cardiac Enzymes No results for input(s): CPK, CKND1, MEGHA in the last 72 hours. No lab exists for component: CKRMB, TROIP   Coagulation Recent Labs     03/10/20  0752   PTP 13.8   INR 1.1       Lipid Panel No results found for: CHOL, CHOLPOCT, CHOLX, CHLST, CHOLV, 958150, HDL, HDLP, LDL, LDLC, DLDLP, 575353, VLDLC, VLDL, TGLX, TRIGL, TRIGP, TGLPOCT, CHHD, CHHDX   BNP No results for input(s): BNPP in the last 72 hours. Liver Enzymes Recent Labs     03/10/20  0752   TP 7.0   ALB 3.7   AP 77   SGOT 23      Thyroid Studies No results found for: T4, T3U, TSH, TSHEXT         Significant Diagnostic Studies: No results found. No results found for this or any previous visit.         CC: Oracio Chance DO

## 2020-03-10 NOTE — ROUTINE PROCESS
Cardiac Cath Lab:  Pre Procedure Chart Check     Patients chart was accessed and reviewed for possible and/or scheduled procedure. Creatinine Clearance:  Serum creatinine: 1.06 mg/dL 03/10/20 0752  Estimated creatinine clearance: 64.1 mL/min    Total Contrast  Load:  3 x estimated clearance amount=  192.3ml    75% of Contrast Load:  0.75 x Total Contrast Load=    144.2ml    Recent Labs     03/10/20  0752   WBC 5.0   RBC 4.54*   HCT 41.1   HGB 13.5      INR 1.1   PTP 13.8      K 3.8   BUN 18   CREA 1.06   GFRAA >60   GFRNA >60   CA 8.7       BMI: Body mass index is 24.82 kg/m².     ALLERGIES: No Known Allergies    Lines:        Peripheral IV 03/10/20 Right Wrist (Active)   Site Assessment Clean, dry, & intact 3/10/2020  7:57 AM   Phlebitis Assessment 0 3/10/2020  7:57 AM   Infiltration Assessment 0 3/10/2020  7:57 AM   Dressing Status Clean, dry, & intact 3/10/2020  7:57 AM   Dressing Type Transparent 3/10/2020  7:57 AM   Hub Color/Line Status Pink 3/10/2020  7:57 AM   Alcohol Cap Used Yes 3/10/2020  7:57 AM       Peripheral IV 03/10/20 Left Antecubital (Active)   Site Assessment Clean, dry, & intact 3/10/2020  7:57 AM   Phlebitis Assessment 0 3/10/2020  7:57 AM   Infiltration Assessment 0 3/10/2020  7:57 AM   Dressing Status Clean, dry, & intact 3/10/2020  7:57 AM   Dressing Type Transparent 3/10/2020  7:57 AM   Hub Color/Line Status Pink 3/10/2020  7:57 AM   Alcohol Cap Used Yes 3/10/2020  7:57 AM          History:    Past Medical History:   Diagnosis Date    Arthritis     BPH (benign prostatic hyperplasia)     CAD (coronary artery disease)     MVP    Cancer (HCC)     skin melanoma    Cardiomegaly     Chronic airflow obstruction (HCC)     pulmonary fibrosis    Chronic obstructive pulmonary disease ([address]     sarcodosis    ED (erectile dysfunction)     Esophageal reflux     Hemorrhoids     Idiopathic pulmonary fibrosis (HCC)     Low testosterone     Mitral valve prolapse     Osteopenia  Personal history of malignant neoplasm of other endocrine glands     Presbycusis     RBBB (right bundle branch block)     Sarcoidosis     Vitamin D deficiency      Past Surgical History:   Procedure Laterality Date    HX HEENT      skin melanoma    HX KNEE REPLACEMENT Bilateral     4x left, 2x right    HX LUMBAR DISKECTOMY      HX ORTHOPAEDIC      back fusion     Patient Active Problem List   Diagnosis Code    Male erectile dysfunction, unspecified N52.9    Male hypogonadism E29.1    Enlarged prostate without lower urinary tract symptoms (luts) N40.0    Urge incontinence N39.41    Delayed ejaculation F52.32    Urinary frequency R35.0    Family history of prostate cancer Z80.42

## 2020-03-10 NOTE — Clinical Note
Bilateral groin, left radial and left brachial clipped, prepped with ChloraPrep and draped. Wet prep elapsed drying time: 5 mins.

## (undated) DEVICE — GOWN,SIRUS,NONRNF,SETINSLV,XL,20/CS: Brand: MEDLINE

## (undated) DEVICE — SUT PROL 5-0 18IN P3 BLU --

## (undated) DEVICE — BSHR MAJOR BASIN PACK-LF: Brand: MEDLINE INDUSTRIES, INC.

## (undated) DEVICE — GUIDEWIRE VASC L260CM DIA0.035IN RAD 3MM J TIP L7CM PTFE

## (undated) DEVICE — PROCEDURE KIT FLUID MGMT 10 FR CUST MAINFOLD

## (undated) DEVICE — PRESSURE MONITORING SET: Brand: TRUWAVE

## (undated) DEVICE — DRAIN SURG L49IN DIA3/32IN 10IN H SIL W/O TRCR END PERF

## (undated) DEVICE — BAND RADIAL COMPR ARTERY 24CM -- REG BX/10

## (undated) DEVICE — DRAPE TWL SURG 16X26IN BLU ORB04] ALLCARE INC]

## (undated) DEVICE — GAUZE SPONGES,12 PLY: Brand: CURITY

## (undated) DEVICE — PACK PROCEDURE SURG CATH CUST

## (undated) DEVICE — SUTURE PDS II SZ 3-0 L27IN ABSRB CLR SH L26MM 1/2 CIR TAPR Z416H

## (undated) DEVICE — SOLUTION SCRB 4OZ 10% PVP I POVIDONE IOD TOP PAINT EXIDINE

## (undated) DEVICE — SUTURE PROL SZ 3-0 L18IN NONABSORBABLE BLU L19MM PC-5 3/8 8632G

## (undated) DEVICE — SPONGE GZ W4XL4IN COT 12 PLY TYP VII WVN C FLD DSGN

## (undated) DEVICE — SENSOR PLSE OXMTR AD CBL L36IN ADH FRM FIT SPO2 DISP

## (undated) DEVICE — INTENDED FOR TISSUE SEPARATION, AND OTHER PROCEDURES THAT REQUIRE A SHARP SURGICAL BLADE TO PUNCTURE OR CUT.: Brand: BARD-PARKER ® CARBON RIB-BACK BLADES

## (undated) DEVICE — SUT ETHLN 3-0 18IN PS1 BLK --

## (undated) DEVICE — X-RAY SPONGES,12 PLY: Brand: DERMACEA

## (undated) DEVICE — Device

## (undated) DEVICE — STERILE POLYISOPRENE POWDER-FREE SURGICAL GLOVES: Brand: PROTEXIS

## (undated) DEVICE — ANGIOGRAPHIC CATHETER: Brand: EXPO™

## (undated) DEVICE — TUBING PRSS MON L24IN PVC RIG NONEXPANDING M TO FEM CONN

## (undated) DEVICE — SWAN-GANZ POLYMER BLEND TRUE SIZE C-TIP CONTROLCATH TD CATHETER: Brand: SWAN-GANZ CONTROLCATH TRUE SIZE

## (undated) DEVICE — ELECTRODE NDL 2.8IN COAT VALLEYLAB

## (undated) DEVICE — STOPCOCK TRNSDUC 500PSI 3 W ROT M LUER LT BLU OFF HNDL R

## (undated) DEVICE — SUT MONOCRYL PLUS UD 4-0 --

## (undated) DEVICE — PAD,EYE,1-5/8X2 5/8,STERILE,LF,1/PK: Brand: MEDLINE

## (undated) DEVICE — SOL IRRIGATION INJ NACL 0.9% 500ML BTL

## (undated) DEVICE — GUIDEWIRE VASC L150CM DIA0.025IN TIP L7CM J RAD 3MM PTFE

## (undated) DEVICE — HEX-LOCKING BLADE ELECTRODE: Brand: EDGE

## (undated) DEVICE — SHIELD RAD 14X16 IN W/ SCOOP ABSORBER

## (undated) DEVICE — REM POLYHESIVE ADULT PATIENT RETURN ELECTRODE: Brand: VALLEYLAB

## (undated) DEVICE — PACK,EENT,STERILE,PK I: Brand: MEDLINE

## (undated) DEVICE — KENDALL SCD EXPRESS SLEEVES, KNEE LENGTH, MEDIUM: Brand: KENDALL SCD

## (undated) DEVICE — 3L THIN WALL CAN: Brand: CRD

## (undated) DEVICE — DEVON™ KNEE AND BODY STRAP 60" X 3" (1.5 M X 7.6 CM): Brand: DEVON

## (undated) DEVICE — GLIDESHEATH SLENDER STAINLESS STEEL KIT: Brand: GLIDESHEATH SLENDER

## (undated) DEVICE — DRAPE,ANGIO,BRACH,STERILE,38X44: Brand: MEDLINE